# Patient Record
Sex: MALE | Race: WHITE | URBAN - NONMETROPOLITAN AREA
[De-identification: names, ages, dates, MRNs, and addresses within clinical notes are randomized per-mention and may not be internally consistent; named-entity substitution may affect disease eponyms.]

---

## 2024-02-20 ENCOUNTER — OV NP (OUTPATIENT)
Dept: URBAN - NONMETROPOLITAN AREA CLINIC 2 | Facility: CLINIC | Age: 54
End: 2024-02-20

## 2024-02-21 ENCOUNTER — OV NP (OUTPATIENT)
Dept: URBAN - NONMETROPOLITAN AREA CLINIC 2 | Facility: CLINIC | Age: 54
End: 2024-02-21

## 2024-02-21 ENCOUNTER — LAB (OUTPATIENT)
Dept: URBAN - NONMETROPOLITAN AREA CLINIC 2 | Facility: CLINIC | Age: 54
End: 2024-02-21

## 2024-02-21 VITALS
HEART RATE: 68 BPM | SYSTOLIC BLOOD PRESSURE: 130 MMHG | WEIGHT: 195 LBS | HEIGHT: 71 IN | BODY MASS INDEX: 27.3 KG/M2 | DIASTOLIC BLOOD PRESSURE: 86 MMHG

## 2024-02-21 NOTE — HPI-TODAY'S VISIT:
2/21/2024 Mr. Carlos presents for evaluation of abdominal bloating and increased flatus.  He has tried some dietary laminations with mild improvement.  Notably improved after eliminating dairy beans and beer.  He does continue to have occasional beer, IPA.  He does do better if he avoids gluten but has never had any celiac lab work.  He is a regular drinker with no updated liver imaging and a history of mildly elevated ALT on outside labs. He moves his bowels quite regularly and does have some loose stool at times.  Today we discussed starting Florastor daily with 1 tablet of psyllium fiber at bedtime.  We will complete lab work today including SIBO testing and have him return to clinic to discuss.  He was given educational material regarding increased gas in the digestive tract. Teaches art at [employer]

## 2024-02-22 LAB
(TTG) AB, IGA: <1
(TTG) AB, IGG: <1
A/G RATIO: 2.5
ABSOLUTE BASOPHILS: 80
ABSOLUTE EOSINOPHILS: 477
ABSOLUTE LYMPHOCYTES: 1654
ABSOLUTE MONOCYTES: 610
ABSOLUTE NEUTROPHILS: 2480
ALBUMIN: 4.7
ALKALINE PHOSPHATASE: 84
ALT (SGPT): 50
ANTIGLIADIN ABS, IGA: <1
AST (SGOT): 35
BASOPHILS: 1.5
BILIRUBIN, TOTAL: 0.6
BUN/CREATININE RATIO: (no result)
BUN: 12
CALCIUM: 9.8
CARBON DIOXIDE, TOTAL: 25
CHLORIDE: 100
CREATININE: 0.92
EGFR: 98
EOSINOPHILS: 9
GLIADIN (DEAMIDATED) AB (IGG): <1
GLOBULIN, TOTAL: 1.9
GLUCOSE: 93
HEMATOCRIT: 43.9
HEMOGLOBIN: 15.2
IMMUNOGLOBULIN A: 67
IMMUNOGLOBULIN A: 67
INTERPRETATION: (no result)
LYMPHOCYTES: 31.2
MCH: 31.7
MCHC: 34.6
MCV: 91.6
MONOCYTES: 11.5
MPV: 10.5
NEUTROPHILS: 46.8
PLATELET COUNT: 210
POTASSIUM: 4.7
PROTEIN, TOTAL: 6.6
RDW: 12.9
RED BLOOD CELL COUNT: 4.79
SODIUM: 137
TISSUE TRANSGLUTAMINASE AB, IGA: <1
WHITE BLOOD CELL COUNT: 5.3

## 2024-03-06 PROBLEM — 707724006: Status: ACTIVE | Noted: 2024-03-06

## 2024-04-17 ENCOUNTER — OV NP (OUTPATIENT)
Dept: URBAN - NONMETROPOLITAN AREA CLINIC 2 | Facility: CLINIC | Age: 54
End: 2024-04-17

## 2024-05-06 ENCOUNTER — OFFICE VISIT (OUTPATIENT)
Dept: URBAN - NONMETROPOLITAN AREA CLINIC 2 | Facility: CLINIC | Age: 54
End: 2024-05-06